# Patient Record
Sex: MALE | Race: WHITE | Employment: UNEMPLOYED | URBAN - METROPOLITAN AREA
[De-identification: names, ages, dates, MRNs, and addresses within clinical notes are randomized per-mention and may not be internally consistent; named-entity substitution may affect disease eponyms.]

---

## 2020-06-15 ENCOUNTER — OFFICE VISIT (OUTPATIENT)
Dept: URGENT CARE | Facility: URGENT CARE | Age: 65
End: 2020-06-15
Payer: COMMERCIAL

## 2020-06-15 ENCOUNTER — ANCILLARY PROCEDURE (OUTPATIENT)
Dept: GENERAL RADIOLOGY | Facility: CLINIC | Age: 65
End: 2020-06-15
Attending: PHYSICIAN ASSISTANT
Payer: COMMERCIAL

## 2020-06-15 VITALS
HEART RATE: 85 BPM | WEIGHT: 241.6 LBS | SYSTOLIC BLOOD PRESSURE: 169 MMHG | RESPIRATION RATE: 22 BRPM | DIASTOLIC BLOOD PRESSURE: 89 MMHG | TEMPERATURE: 98.9 F | OXYGEN SATURATION: 93 %

## 2020-06-15 DIAGNOSIS — M25.562 ACUTE PAIN OF LEFT KNEE: Primary | ICD-10-CM

## 2020-06-15 DIAGNOSIS — S83.207A TEAR OF MENISCUS OF LEFT KNEE AS CURRENT INJURY, UNSPECIFIED MENISCUS, UNSPECIFIED TEAR TYPE, INITIAL ENCOUNTER: ICD-10-CM

## 2020-06-15 DIAGNOSIS — M25.462 EFFUSION OF LEFT KNEE: ICD-10-CM

## 2020-06-15 PROCEDURE — 99204 OFFICE O/P NEW MOD 45 MIN: CPT | Performed by: PHYSICIAN ASSISTANT

## 2020-06-15 PROCEDURE — 73560 X-RAY EXAM OF KNEE 1 OR 2: CPT | Mod: LT

## 2020-06-15 RX ORDER — PANTOPRAZOLE SODIUM 40 MG/1
40 TABLET, DELAYED RELEASE ORAL DAILY
COMMUNITY

## 2020-06-15 RX ORDER — HYDROXYZINE HYDROCHLORIDE 25 MG/1
25 TABLET, FILM COATED ORAL 3 TIMES DAILY PRN
COMMUNITY

## 2020-06-15 RX ORDER — CHLORAL HYDRATE 500 MG
2 CAPSULE ORAL DAILY
COMMUNITY

## 2020-06-15 RX ORDER — ATORVASTATIN CALCIUM 40 MG/1
40 TABLET, FILM COATED ORAL DAILY
COMMUNITY

## 2020-06-15 RX ORDER — MELOXICAM 7.5 MG/1
7.5 TABLET ORAL DAILY
COMMUNITY

## 2020-06-15 RX ORDER — SERTRALINE HYDROCHLORIDE 100 MG/1
100 TABLET, FILM COATED ORAL DAILY
COMMUNITY

## 2020-06-15 RX ORDER — ASPIRIN 81 MG/1
81 TABLET ORAL DAILY
COMMUNITY

## 2020-06-15 RX ORDER — CYCLOBENZAPRINE HCL 10 MG
10 TABLET ORAL 3 TIMES DAILY PRN
COMMUNITY

## 2020-06-15 RX ORDER — GABAPENTIN 600 MG/1
1200 TABLET ORAL 3 TIMES DAILY
COMMUNITY

## 2020-06-15 ASSESSMENT — ENCOUNTER SYMPTOMS
HEADACHES: 0
ENDOCRINE NEGATIVE: 1
ABDOMINAL PAIN: 0
SORE THROAT: 0
SHORTNESS OF BREATH: 0
GASTROINTESTINAL NEGATIVE: 1
MYALGIAS: 0
HEMATURIA: 0
CONSTITUTIONAL NEGATIVE: 1
COUGH: 0
FEVER: 0
FREQUENCY: 0
WEAKNESS: 0
DIARRHEA: 0
RESPIRATORY NEGATIVE: 1
POLYDIPSIA: 0
CHEST TIGHTNESS: 0
NEUROLOGICAL NEGATIVE: 1
DIAPHORESIS: 0
DIZZINESS: 0
ADENOPATHY: 0
VOMITING: 0
EYE ITCHING: 0
EYES NEGATIVE: 1
RHINORRHEA: 0
CARDIOVASCULAR NEGATIVE: 1
EYE REDNESS: 0
EYE DISCHARGE: 0
CHILLS: 0
LIGHT-HEADEDNESS: 0
ARTHRALGIAS: 1
NAUSEA: 0
DYSURIA: 0
WHEEZING: 0
PALPITATIONS: 0

## 2020-06-15 ASSESSMENT — PAIN SCALES - GENERAL: PAINLEVEL: WORST PAIN (10)

## 2020-06-15 NOTE — PROGRESS NOTES
Chief Complaint:     Chief Complaint   Patient presents with     Musculoskeletal Problem     Twisted left knee 1 week ago. Has pain from left hip down leg        HPI: Isaías Rangel is an 64 year old male who presents today for evaluation of Left knee pain.  Onset of the pain was 1 week ago after he twisted the knee.    The patient has had locking of the knee several times per day.  No feeling of instability.  Patient has not injured the knee in the past.  Patient can walk on the L leg with the use of a cane.    Patient is new to [facility].    ROS:      Review of Systems   Constitutional: Negative.  Negative for chills, diaphoresis and fever.   HENT: Negative.  Negative for congestion, ear pain, rhinorrhea and sore throat.    Eyes: Negative.  Negative for discharge, redness and itching.   Respiratory: Negative.  Negative for cough, chest tightness, shortness of breath and wheezing.    Cardiovascular: Negative.  Negative for chest pain and palpitations.   Gastrointestinal: Negative.  Negative for abdominal pain, diarrhea, nausea and vomiting.   Endocrine: Negative.  Negative for polydipsia and polyuria.   Genitourinary: Negative for dysuria, frequency, hematuria and urgency.   Musculoskeletal: Positive for arthralgias. Negative for myalgias.   Skin: Negative for rash.   Allergic/Immunologic: Negative for immunocompromised state.   Neurological: Negative.  Negative for dizziness, weakness, light-headedness and headaches.   Hematological: Negative for adenopathy.        No pertinent family or medical Hx at this time.  Patient is a former smoker.  No pertinent surgical Hx at this time.    Problem history  There is no problem list on file for this patient.       Allergies  Allergies not on file     Smoking History  History   Smoking Status     Former Smoker   Smokeless Tobacco     Never Used        Current Meds    Current Outpatient Medications:      aspirin 81 MG EC tablet, Take 81 mg by mouth daily, Disp: ,  Rfl:      atorvastatin (LIPITOR) 40 MG tablet, Take 40 mg by mouth daily, Disp: , Rfl:      Cholecalciferol (VITAMIN D3) 25 MCG (1000 UT) CAPS, , Disp: , Rfl:      cyclobenzaprine (FLEXERIL) 10 MG tablet, Take 10 mg by mouth 3 times daily as needed for muscle spasms, Disp: , Rfl:      fish oil-omega-3 fatty acids 1000 MG capsule, Take 2 g by mouth daily, Disp: , Rfl:      gabapentin (NEURONTIN) 600 MG tablet, Take 1,200 mg by mouth 3 times daily, Disp: , Rfl:      hydrOXYzine (ATARAX) 25 MG tablet, Take 25 mg by mouth 3 times daily as needed for itching, Disp: , Rfl:      meloxicam (MOBIC) 7.5 MG tablet, Take 7.5 mg by mouth daily, Disp: , Rfl:      order for DME, Knee immobilizer and crutches, Disp: 1 each, Rfl: 0     pantoprazole (PROTONIX) 40 MG EC tablet, Take 40 mg by mouth daily, Disp: , Rfl:      sertraline (ZOLOFT) 100 MG tablet, Take 100 mg by mouth daily, Disp: , Rfl:         Vital signs reviewed by Carroll Davidson PA-C  BP (!) 169/89   Pulse 85   Temp 98.9  F (37.2  C) (Tympanic)   Resp 22   Wt 109.6 kg (241 lb 9.6 oz)   SpO2 93%     Physical Exam     Physical Exam  Vitals signs and nursing note reviewed.   Constitutional:       General: He is not in acute distress.     Appearance: He is well-developed. He is not ill-appearing, toxic-appearing or diaphoretic.   HENT:      Head: Normocephalic and atraumatic.      Right Ear: Hearing, tympanic membrane, ear canal and external ear normal. Tympanic membrane is not perforated, erythematous, retracted or bulging.      Left Ear: Hearing, tympanic membrane, ear canal and external ear normal. Tympanic membrane is not perforated, erythematous, retracted or bulging.      Nose: Nose normal. No mucosal edema or rhinorrhea.      Mouth/Throat:      Pharynx: No oropharyngeal exudate or posterior oropharyngeal erythema.      Tonsils: No tonsillar exudate or tonsillar abscesses. 0 on the right. 0 on the left.   Eyes:      Pupils: Pupils are equal, round, and reactive  to light.   Neck:      Musculoskeletal: Normal range of motion and neck supple.   Cardiovascular:      Rate and Rhythm: Normal rate and regular rhythm.      Heart sounds: Normal heart sounds, S1 normal and S2 normal. Heart sounds not distant. No murmur. No friction rub. No gallop.    Pulmonary:      Effort: Pulmonary effort is normal. No respiratory distress.      Breath sounds: Normal breath sounds. No decreased breath sounds, wheezing, rhonchi or rales.   Abdominal:      General: Bowel sounds are normal. There is no distension.      Palpations: Abdomen is soft.      Tenderness: There is no abdominal tenderness.   Musculoskeletal:      Left knee: He exhibits effusion and abnormal meniscus. He exhibits normal range of motion, no swelling, no ecchymosis, no deformity, no LCL laxity, no bony tenderness and no MCL laxity. Tenderness found. Medial joint line, lateral joint line and LCL tenderness noted. No MCL and no patellar tendon tenderness noted.   Lymphadenopathy:      Cervical: No cervical adenopathy.   Skin:     General: Skin is warm and dry.      Findings: No rash.   Neurological:      Mental Status: He is alert.      Cranial Nerves: No cranial nerve deficit.   Psychiatric:         Attention and Perception: He is attentive.         Speech: Speech normal.         Behavior: Behavior normal. Behavior is cooperative.         Thought Content: Thought content normal.         Judgment: Judgment normal.          Knees: Left knee    Appearance: no lesions, swelling, ecchymosis     ROM:    Flexibility: full range of motion       Palpation:       Effusion: Severe       Joint Line: Painful medial and lateral.        Patella:       Compression: Neg       Apprehension: Neg     Stability:       Lachman: Negative       Ant Drawer: Negative          Tibial tuberosity: no sig tenderness or swelling                  Weight Bearing:  the patient is able to bear weight on the injured leg at this time.  Pulses and sensation in both  ankles are normal.  There is full ROM of both ankles.  Strength in both ankles is 5/5       ASSESSMENT     1. Acute pain of left knee    2. Tear of meniscus of left knee as current injury, unspecified meniscus, unspecified tear type, initial encounter    3. Effusion of left knee           PLAN    XR of the L knee was negative for any acute fracture per my read.  Suspect meniscus tear.  Patient placed in knee immobilizer and will be weight bearing as tolerated using crutches.  Order placed for him to follow up with ortho.  JENIFER discussed.  Recommended rest and avoidance of activities which cause pain or swelling.  Ice for 15-20 minutes every 2-3 hrs while awake for 2 days.  Elevation.  Pain relief: acetaminophen for the first 24 hours, then ibuprofen with food.  Patient verbalized understanding, and agrees with this plan.        Carroll Davidson PA-C  6/15/2020, 10:44 AM

## 2020-06-15 NOTE — PATIENT INSTRUCTIONS
Patient Education     Fluid on the Knee    Fluid on the knee is also known as knee effusion. The knee joint normally has less than 1 ounce of fluid. Injury or inflammation of the knee joint causes extra fluid to collect there. When this happens, the knee joint looks swollen and is often painful. It may be hard to fully bend the knee.  The most common cause of fluid on the knee is osteoarthritis due to wear and tear on the joint cartilage. Other causes include injury to the cartilage, inflammatory arthritis such as gout or rheumatoid arthritis, and infection of the joint.  If the cause of the fluid is not certain, you may need a needle aspiration. This procedure removes a sample of joint fluid from the knee for testing. Removing excess fluid may also relieve swelling and pain.  Home care    Limit your activities. Stay off the injured leg as much as possible until pain improves.    Keep your leg elevated to reduce pain and swelling. When sleeping, place a pillow under the injured leg. When sitting, support the injured leg so it is above heart level. This is very important during the first 48 hours.    Apply an ice pack over the injured area for 15 to 20 minutes every 3 to 6 hours. You should do this for the first 24 to 48 hours. You can make an ice pack by filling a plastic bag that seals at the top with ice cubes and then wrapping it with a thin towel. Continue to use ice packs for relief of pain and swelling as needed. As the ice melts, be careful not to get your wrap, splint, or cast wet. After 48 hours, apply heat (warm shower or warm bath) for 15 to 20 minutes several times a day, or alternate ice and heat. If you have to wear a hook-and-loop knee brace, you can open it to apply the ice pack, or heat, directly to the knee. Never put ice directly on the skin. Always wrap the ice in a towel or other type of cloth.    You may use over-the-counter pain medicine to control pain, unless another pain medicine was  prescribed. If you have chronic liver or kidney disease or have ever had a stomach ulcer or gastrointestinal bleeding, talk with your healthcare provider before using these medicines.    If crutches or a walker have been recommended, don't put weight on the injured leg until you can do so without pain. Check with your healthcare provider before returning to sports or full work duties.    If you have a hook-and-loop knee brace, you can remove it to bathe and sleep, unless told otherwise.  Follow-up care  Follow up with your healthcare provider as advised.  If you are overweight, talk to your healthcare provider about a weight loss program. The excess weight puts extra strain on your knees.  When to seek medical advice  Call your healthcare provider right away if any of these occur:    Increasing pain, redness, or swelling of the knee    Fever of 100.4 F (38 C) or above lasting for 24 to 48 hours, or as advised    Shaking chills  Date Last Reviewed: 5/1/2018 2000-2019 [employer]. [address] [place] [address]. All rights reserved. This information is not intended as a substitute for professional medical care. Always follow your healthcare professional's instructions.           Patient Education     Understanding Meniscal Tears    The meniscus is a tough cushion of fibrous tissue called cartilage in the knee joint. It cushions the knee. It absorbs shock and helps spread weight across the knee joint. It also works with other parts of the knee to help keep the joint stable. Injury or aging can cause the meniscus to tear and lead to pain and problems using the knee.   What causes meniscal tears?  A sudden injury can tear the meniscus. This is often because of planting the foot and twisting the knee. Sports such as soccer, football, and basketball are often involved. Repeated actions, such as squatting, may also lead to a tear. Breakdown of the meniscus because of aging can also lead to  tears.  Symptoms of meniscal tears  These can include:    Knee pain    Knee swelling    Catching of the knee or inability to straighten the knee    Unstable feeling in the knee  Treatment for meniscal tears  A tear is unlikely to heal on its own. You often will need surgery to repair a tear. In many cases, your healthcare provider will first try treatments to help relieve symptoms. These may include:    Rest the knee. This means avoiding any activity that puts stress on the knee joint. These include kneeling, squatting, jogging, and climbing stairs. In some cases, you may need to use crutches for a time to keep body weight off of the knee joint.    Cold pack. Putting a cold pack on the knee helps reduce pain and swelling.    Knee brace. Bracing the knee helps support it.    Medicine. Prescription and over-the-counter pain medicines can help relieve swelling and pain.    Exercises. Exercises help strengthen the muscles of the leg to help support the knee joint.  If these treatments don t help relieve symptoms or the injury is severe, you may need surgery. This can repair the meniscus to relieve symptoms and restore movement.     When to call your healthcare provider  Call your healthcare provider right away if you have any of these:    Fever of 100.4 F (38 C) or higher, or as directed    Pain or swelling that gets worse, including pain in the calf    Numbness or tingling in leg or foot    You suddenly can t put any weight on your leg    Your knee  locks    Date Last Reviewed: 3/10/2016    8408-3487 [employer]. [address] [place] [address], [place] [address]. All rights reserved. This information is not intended as a substitute for professional medical care. Always follow your healthcare professional's instructions.